# Patient Record
Sex: MALE | Race: WHITE | NOT HISPANIC OR LATINO
[De-identification: names, ages, dates, MRNs, and addresses within clinical notes are randomized per-mention and may not be internally consistent; named-entity substitution may affect disease eponyms.]

---
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
## 2023-01-22 NOTE — ED PROVIDER NOTE - PRINCIPAL DIAGNOSIS
DISPLAY PLAN FREE TEXT DISPLAY PLAN FREE TEXT DISPLAY PLAN FREE TEXT DISPLAY PLAN FREE TEXT DISPLAY PLAN FREE TEXT DISPLAY PLAN FREE TEXT DISPLAY PLAN FREE TEXT DISPLAY PLAN FREE TEXT DISPLAY PLAN FREE TEXT Foot pain, right DISPLAY PLAN FREE TEXT DISPLAY PLAN FREE TEXT DISPLAY PLAN FREE TEXT DISPLAY PLAN FREE TEXT

## 2023-03-31 ENCOUNTER — RX RENEWAL (OUTPATIENT)
Age: 18
End: 2023-03-31

## 2023-04-04 ENCOUNTER — NON-APPOINTMENT (OUTPATIENT)
Age: 18
End: 2023-04-04

## 2023-05-21 NOTE — ED PEDIATRIC NURSE NOTE - NS ED NURSE RECORD ANOTHER HT AND WT
Please call patient with below results ( we reviewed others at our visit):     HIV negative. Your vitamin D was still low, I recommend taking vitamin D3 [id number] international units daily, you can buy this over the counter at any pharmacy.      Yes

## 2023-08-07 NOTE — ED PROVIDER NOTE - MUSCULOSKELETAL
EXAM NOTE          Chief Complaint   Patient presents with   • Establish Care       HPI:  Samia Gutierrez is a 63 year old female who presents to [facility]. Transfer of care from [facility].     Pt is concerned about her blood pressure. She thinks it is running too low because her diastolic BP is in the 60s. She checks her blood pressure at home. She had a BP of 115/69 and 117/68 over the past 2 days and was concerned that this was too low.     Also c/o feeling very tired. She is worried that this is due to her BP running too low. She is retired but works 3 months a year at a [employer]. She reports feeling SOB and tired when she has to walk an incline. She had COVID pneumonia in 2021. Family h/o pulmonary fibrosis in her father and brother.     C/o right hand pain and swelling. Has noticed a bony prominence in her right hand near her 1st and 3rd fingers. Pt's mother had Rheumatoid arthritis. Pt reports that she tested negative for this in the past.     Pt also reports that she noticed a bulge in her upper abdomen a couple times about 1 year ago when she coughed or laughed really hard.       Patient Active Problem List   Diagnosis   • Body mass index (BMI) of 26.0-26.9 in adult   • Family history of breast cancer   • Family history of colon cancer   • Family history of ovarian cancer   • Raynaud phenomenon   • Family history of pulmonary fibrosis   • Combined forms of age-related cataract of both eyes   • Myopia of both eyes with regular astigmatism and presbyopia   • Encounter for fitting or adjustment of spectacles or contact lenses   • History of ophthalmic migraine   • Dry eye syndrome of both eyes   • Hyperlipidemia, mixed     Past Medical History:   Diagnosis Date   • ASCUS of cervix with negative high risk HPV 08/17/2020   • History of COVID-19 11/2021    with pneumonia   • Hyperlipidemia, mixed 10/2022   • Major depressive disorder 02/18/2021   • Raynaud phenomenon 04/06/2021     Past Surgical  History:   Procedure Laterality Date   • [place] tooth extraction      x2     ALLERGIES:  No Known Allergies  Family History   Problem Relation Age of Onset   • Cancer, Breast Mother 34   • Cancer, Pancreatic Mother 75   • Rheumatoid Arthritis Mother    • COPD Father    • Lung Disease Father         pulmonary fibrosis   • Cervical cancer Sister    • Lung Disease Brother         pulmonary fibrosis, had bilateral lung transplant   • Hypertension Brother    • Natural Causes Brother    • Diabetes Brother    • Heart disease Brother    • HIV Brother 35   • Cancer Maternal Grandmother    • Cancer Maternal Grandfather    • Natural Causes Paternal Grandmother    • Cancer, Lung Paternal Grandfather    • Leukemia Maternal Aunt    • Cancer, Breast Maternal Aunt    • Cancer, Ovarian Maternal Aunt      Social History     Tobacco Use   • Smoking status: Never   • Smokeless tobacco: Never   Vaping Use   • Vaping Use: never used   Substance Use Topics   • Alcohol use: Yes   • Drug use: Never      Current Outpatient Medications   Medication Sig Dispense Refill   • sodium fluoride (SF 5000 Plus) 1.1 % dental cream Brush teeth nightly and spit out without rinsing. 102 g 2   • Multiple Vitamin (MULTIVITAMIN ADULT PO)      • Cyanocobalamin (B-12 PO)      • Ascorbic Acid (VITAMIN C PO)      • Cholecalciferol (D3 ADULT PO)      • ZINC SULFATE PO      • QUERCETIN PO        No current facility-administered medications for this visit.        Recent PHQ 2/9 Score    PHQ 2:  PHQ 2 Score Adult PHQ 2 Score Adult PHQ 2 Interpretation Little interest or pleasure in activity?   8/7/2023  10:41 AM 0 No further screening needed 0       PHQ 9:       DEPRESSION ASSESSMENT/PLAN:  Depression screening is negative no further plan needed.        REVIEW OF SYSTEMS    Constitutional:  Denies weight loss, generalized fatigue, chills, night sweats.  Eye Problem(s):glasses or contacts, otherwise negative  ENT Problem(s):negative  Cardiovascular problem(s):dyspnea  on exertion, otherwise negative  Respiratory problem(s):negative  Gastro-intestinal problem(s):negative GI  Genito-urinary problem(s):incontinence, otherwise negative  Musculoskeletal problem(s):arthritis, right hand pain and swelling, otherwise negative  Integumentary problem(s):negative  Neurological problem(s):negative  Psychiatric problem(s):negative  Endocrine problem(s):negative  Hematologic and/or Lymphatic problem(s):negative    PHYSICAL EXAM    Visit Vitals  /68   Pulse 76   Temp 98 °F (36.7 °C) (Temporal)   Resp 18   Wt 71.2 kg (157 lb)   BMI 27.81 kg/m²     General:  Well developed, alert/oriented x 3. No acute distress.    HEENT: normocephalic. No masses, lesions, tenderness or abnormalities  Eye: PERRL, EOMI, sclera white and nonicteric, no conjunctival erythema, exudate or swelling; normal lids  MOUTH:mucosa pink and moist.  THROAT:Lips, mucosa, and tongue normal. Teeth and gums normal. Oropharynx clear  Neck:  Supple. Nontender. Normal range of motion. No masses.   Respiratory:  Normal respiratory effort. No chest wall tenderness. Lungs clear to auscultation bilaterally. Symmetrical chest expansion.    Cardiovascular:  Regular rate and rhythm. No murmurs, rubs or gallops. Normal S1 and S2. No S3 or S4. No JVD (jugular venous distention).    Gastrointestinal:  Soft. Nontender. Nondistended. Normal bowel sounds. No pulsatile or other abdominal masses. No hepatosplenomegaly or splenomegaly.   Extremities: Good radial and dorsalis pedis pulses bilaterally. No peripheral edema. No cyanosis or clubbing.  Right hand with mild swelling and bony prominence at 1st and 3rd MCP joints. The hands reveal normal motor, sensory, vascular and tendon function.     ASSESSMENT & PLAN    1. Malaise and fatigue  Informed pt that these symptoms would not be due to her Blood pressure which pt was informed is normal and not too low. Labs ordered to evaluate - CBC, TSH, CMP, Vit B12, Folate and iron levels.     2.  Hyperlipidemia, mixed  LDL (mg/dL)   Date Value   10/25/2022 137 (H)     Pt had lipid panel done 10/2022 and her total cholesterol and LDL were high. Will repeat labs today with Lipid panel. Follow a low fat diet.  Will await results.     3. Asymptomatic menopause  Bone Density is due and ordered.     4. Right hand pain  Bony prominence  Family h/o RA in her mother. Pt has tested negative for RA in the past. Likely osteoarthritis. Xray ordered to evaluate. Will await results.     5. MILLER (dyspnea on exertion)  DDX includes valvular heart disease, lung disease (asthma, COPD, pulmonary fibrosis - pt with family history of this). Echocardiogram and PFTs ordered to evaluate. Will await results.        Orders Placed This Encounter   • BD DEXA SCAN AXIAL SKELETON   • XR HAND 3 OR MORE VIEWS RIGHT   • Comprehensive Metabolic Panel   • Lipid Panel With Reflex   • Thyroid Stimulating Hormone Reflex   • CBC with Automated Differential   • Iron And total Iron Binding Capacity   • Vitamin B12 And Folate   • TTE Echo Complete   • PFT         Return in about 4 months (around 12/7/2023) for welcome to medicare and pap.    Nathalia Guzman MD   Spine appears normal, movement of extremities grossly intact.

## 2023-08-14 ENCOUNTER — APPOINTMENT (OUTPATIENT)
Dept: PEDIATRICS | Facility: CLINIC | Age: 18
End: 2023-08-14
Payer: COMMERCIAL

## 2023-08-14 VITALS — HEART RATE: 68 BPM | SYSTOLIC BLOOD PRESSURE: 108 MMHG | DIASTOLIC BLOOD PRESSURE: 70 MMHG

## 2023-08-14 DIAGNOSIS — Z00.00 ENCOUNTER FOR GENERAL ADULT MEDICAL EXAMINATION W/OUT ABNORMAL FINDINGS: ICD-10-CM

## 2023-08-14 DIAGNOSIS — M21.42 FLAT FOOT [PES PLANUS] (ACQUIRED), RIGHT FOOT: ICD-10-CM

## 2023-08-14 DIAGNOSIS — M21.41 FLAT FOOT [PES PLANUS] (ACQUIRED), RIGHT FOOT: ICD-10-CM

## 2023-08-14 PROCEDURE — 99395 PREV VISIT EST AGE 18-39: CPT | Mod: 25

## 2023-08-14 PROCEDURE — 99173 VISUAL ACUITY SCREEN: CPT | Mod: 59

## 2023-08-15 VITALS — HEIGHT: 74.9 IN | BODY MASS INDEX: 22.37 KG/M2 | WEIGHT: 178 LBS

## 2023-08-15 PROBLEM — M21.41 PES PLANUS OF BOTH FEET: Status: ACTIVE | Noted: 2019-07-12

## 2023-08-15 NOTE — RISK ASSESSMENT
[0] : 2) Feeling down, depressed, or hopeless: Not at all (0) [PHQ-2 Negative - No further assessment needed] : PHQ-2 Negative - No further assessment needed [[id number]] : 0

## 2023-08-15 NOTE — HISTORY OF PRESENT ILLNESS
[Up to date] : Up to date [Sleep Concerns] : no sleep concerns [Eats regular meals including adequate fruits and vegetables] : eats regular meals including adequate fruits and vegetables [At least 1 hour of physical activity a day] : at least 1 hour of physical activity a day [Uses electronic nicotine delivery system] : does not use electronic nicotine delivery system [Uses tobacco] : does not use tobacco [Uses drugs] : does not use drugs  [Drinks alcohol] : does not drink alcohol [Yes] : Patient has had sexual intercourse. 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 Tolerates med. Asymptomatic now -IBS sx's still somewhat present

## 2023-11-12 ENCOUNTER — RX RENEWAL (OUTPATIENT)
Age: 18
End: 2023-11-12

## 2024-01-02 NOTE — ED PROVIDER NOTE - NS ED MD EM SELECTION
reports that he has never smoked. He has never used smokeless tobacco. He reports that he does not drink alcohol and does not use drugs.  Family History: family history includes Arthritis in his mother; Atrial Fibrillation in his mother; Diabetes in his father; Heart Surgery in his father; High Blood Pressure in his father; Hypertension in his father; Liver Disease in his mother; Other in his mother.   Allergies: Patient has no known allergies.    Unless otherwise stated in this report the patient's positive and negative responses for review of systems for constitutional, eyes, ENT, cardiovascular, respiratory, gastrointestinal, neurological, , musculoskeletal, and integument systems and related systems to the presenting problem are either stated in the history of present illness or were not pertinent or were negative for the symptoms and/or complaints related to the presenting medical problem.  Positives and pertinent negatives as per HPI.  All others reviewed and are negative.    Physical Exam   VS:    Vitals:    01/02/24 1323   BP: 118/70   Site: Right Upper Arm   Position: Sitting   Cuff Size: Large Adult   Pulse: 92   Temp: 97.6 °F (36.4 °C)   TempSrc: Temporal   SpO2: 98%   Weight: 93.4 kg (206 lb)   Height: 1.676 m (5' 5.98\")     Oxygen Saturation Interpretation: Normal.    Constitutional:  Alert, development consistent with age. NAD.  Head:  NC/NT. Airway patent.  Moderate TTP over maxillary and frontal sinuses.   Ear: Bilateral external auditory ear canals are clear there is no cerumen, erythema or debris present.  Bilateral tympanic membrane intact, translucent and normal cone of light.  There is no serous effusion or drainage present.  Nose: Bilateral turbinates are swollen.  No septal deviation.  Rhinorrhea present.  Mouth: Posterior pharynx with mild erythema and clear postnasal drip. No tonsillar hypertrophy or exudate.   Neck:  Normal ROM. Supple. No anterior cervical adenopathy noted.  Lungs: CTAB 
[id number] Comprehensive

## 2024-04-09 RX ORDER — SERTRALINE HYDROCHLORIDE 100 MG/1
100 TABLET, FILM COATED ORAL
Qty: 90 | Refills: 0 | Status: ACTIVE | COMMUNITY
Start: 2022-06-07 | End: 1900-01-01

## 2024-05-30 ENCOUNTER — APPOINTMENT (OUTPATIENT)
Dept: PEDIATRICS | Facility: CLINIC | Age: 19
End: 2024-05-30
Payer: COMMERCIAL

## 2024-05-30 DIAGNOSIS — Z79.899 OTHER LONG TERM (CURRENT) DRUG THERAPY: ICD-10-CM

## 2024-05-30 DIAGNOSIS — F41.8 OTHER SPECIFIED ANXIETY DISORDERS: ICD-10-CM

## 2024-05-30 PROCEDURE — G2211 COMPLEX E/M VISIT ADD ON: CPT

## 2024-05-30 PROCEDURE — 99214 OFFICE O/P EST MOD 30 MIN: CPT

## 2024-05-31 VITALS — WEIGHT: 194 LBS | BODY MASS INDEX: 23.87 KG/M2 | HEIGHT: 75.5 IN

## 2024-05-31 PROBLEM — F41.8 DEPRESSION WITH ANXIETY: Status: ACTIVE | Noted: 2021-07-25

## 2024-05-31 PROBLEM — Z79.899 LONG TERM USE OF DRUG: Status: ACTIVE | Noted: 2024-05-31

## 2024-05-31 NOTE — PLAN
[TextEntry] : Disc R/B oif discontinuing sertraline. If pt wants to stop summer would be good time to try. Disc wean schedule- going to 50 mg x 3 weeks and then 25 mg x 3 weeks. Pt will f/u by phone if he begins a wean.

## 2024-05-31 NOTE — HISTORY OF PRESENT ILLNESS
[de-identified] : med check appt [FreeTextEntry6] :  Pt consented to mother being in room during visit   Pt with h/o anxiety and depression    med: sertraline 100 qd Pt tolerating med well. He reports not feeling aconcerning level of anxiety and denies sadness. Mom reports he did not do well academically and seems to have altered sleep schedule. He did gain wt and is exercising less. Pt agrees with this factual info but does not think it is due to depression. He wants to wean off sertraline.   Pt now plans a career in medical/pharm sales

## 2024-07-08 ENCOUNTER — NON-APPOINTMENT (OUTPATIENT)
Age: 19
End: 2024-07-08

## 2024-07-31 NOTE — ED STATDOCS - DATE/TIME 1
Contacted patient off of Medication Management  to verify needs of services in attempts to offer patient an appointment at Available Office. Writer verified Full Name, , Callback Number, Address, and Insurance. Writer spoke with    1st call attempt    Update  [facility]    [phone]      03-May-2021 21:25

## 2024-08-15 ENCOUNTER — APPOINTMENT (OUTPATIENT)
Dept: PEDIATRICS | Facility: CLINIC | Age: 19
End: 2024-08-15

## 2024-12-19 ENCOUNTER — APPOINTMENT (OUTPATIENT)
Dept: PEDIATRICS | Facility: CLINIC | Age: 19
End: 2024-12-19